# Patient Record
Sex: MALE | Race: WHITE | NOT HISPANIC OR LATINO | URBAN - METROPOLITAN AREA
[De-identification: names, ages, dates, MRNs, and addresses within clinical notes are randomized per-mention and may not be internally consistent; named-entity substitution may affect disease eponyms.]

---

## 2020-07-25 ENCOUNTER — TELEPHONE ENCOUNTER (OUTPATIENT)
Dept: URBAN - METROPOLITAN AREA CLINIC 13 | Facility: CLINIC | Age: 75
End: 2020-07-25

## 2020-07-25 RX ORDER — METRONIDAZOLE 250 MG/1
TAKE 1 TABLET BY MOUTH 4 TIMES A DAY FOR 14 DAYS TABLET ORAL
Qty: 56 | Refills: 1 | OUTPATIENT
Start: 2013-01-07 | End: 2013-09-20

## 2020-07-25 RX ORDER — ROSUVASTATIN CALCIUM 5 MG
TAKE 1 TABLET DAILY TABLET ORAL
Refills: 0 | OUTPATIENT
Start: 2011-06-01 | End: 2011-06-29

## 2020-07-25 RX ORDER — POLYETHYLENE GLYCOL 3350, SODIUM CHLORIDE, SODIUM BICARBONATE AND POTASSIUM CHLORIDE WITH LEMON FLAVOR 420; 11.2; 5.72; 1.48 G/4L; G/4L; G/4L; G/4L
TAKE 1/2 GALLON AT 5:00 PM DAY BEFORE PROCEDURE, TAKE SECOND 1/2 OF GALLON 6 HRS PRIOR TO PROCEDURE POWDER, FOR SOLUTION ORAL
Qty: 1 | Refills: 0 | OUTPATIENT
Start: 2018-11-08 | End: 2018-12-10

## 2020-07-25 RX ORDER — POLYETHYLENE GLYCOL 3350, SODIUM SULFATE, SODIUM CHLORIDE, POTASSIUM CHLORIDE, ASCORBIC ACID, SODIUM ASCORBATE 7.5-2.691G
TAKE 32 OZ AS DIRECTED 5:00PM THE EVENING BEFORE AND 6HR PRIOR TO PROCEDURE KIT ORAL
Qty: 1 | Refills: 0 | OUTPATIENT
Start: 2013-10-18

## 2020-07-25 RX ORDER — SODIUM SULFATE, POTASSIUM SULFATE, MAGNESIUM SULFATE 17.5; 3.13; 1.6 G/ML; G/ML; G/ML
TAKE 1 BOTTLE AT 5:00PM THE DAY BEFORE PROCEDURE. TAKE 2ND BOTTLE 6 HRS PRIOR TO PROCEDURE SOLUTION, CONCENTRATE ORAL
Qty: 1 | Refills: 0 | OUTPATIENT
Start: 2018-12-10 | End: 2018-12-12

## 2020-07-25 RX ORDER — POLYETHYLENE GLYCOL 3350, SODIUM CHLORIDE, SODIUM BICARBONATE AND POTASSIUM CHLORIDE WITH LEMON FLAVOR 420; 11.2; 5.72; 1.48 G/4L; G/4L; G/4L; G/4L
USE AS DIRECTED POWDER, FOR SOLUTION ORAL
Qty: 1 | Refills: 0 | OUTPATIENT
Start: 2013-10-18 | End: 2013-10-29

## 2020-07-26 ENCOUNTER — TELEPHONE ENCOUNTER (OUTPATIENT)
Dept: URBAN - METROPOLITAN AREA CLINIC 13 | Facility: CLINIC | Age: 75
End: 2020-07-26

## 2020-07-26 RX ORDER — FLECAINIDE ACETATE 50 MG/1
TABLET ORAL
Qty: 180 | Refills: 0 | Status: ACTIVE | COMMUNITY
Start: 2017-07-03

## 2020-07-26 RX ORDER — METOPROLOL SUCCINATE 25 MG/1
TABLET, EXTENDED RELEASE ORAL
Qty: 90 | Refills: 0 | Status: ACTIVE | COMMUNITY
Start: 2017-04-05

## 2020-07-26 RX ORDER — MELOXICAM 15 MG/1
TABLET ORAL
Qty: 30 | Refills: 0 | Status: ACTIVE | COMMUNITY
Start: 2018-02-19

## 2020-07-26 RX ORDER — MUPIROCIN 20 MG/G
OINTMENT TOPICAL
Qty: 22 | Refills: 0 | Status: ACTIVE | COMMUNITY
Start: 2017-12-22

## 2020-07-26 RX ORDER — ROSUVASTATIN CALCIUM 20 MG/1
TABLET, FILM COATED ORAL
Qty: 90 | Refills: 0 | Status: ACTIVE | COMMUNITY
Start: 2017-10-10

## 2020-07-26 RX ORDER — ROSUVASTATIN CALCIUM 5 MG
TAKE 1 TABLET DAILY PT STATES UNKNOWN DOSE TABLET ORAL
Refills: 0 | Status: ACTIVE | COMMUNITY
Start: 2011-06-29

## 2020-07-26 RX ORDER — MEMANTINE HYDROCHLORIDE 10 MG/1
TABLET ORAL
Qty: 90 | Refills: 0 | Status: ACTIVE | COMMUNITY
Start: 2018-03-15

## 2020-07-26 RX ORDER — APIXABAN 5 MG/1
TABLET, FILM COATED ORAL
Qty: 180 | Refills: 0 | Status: ACTIVE | COMMUNITY
Start: 2017-10-11

## 2020-08-18 ENCOUNTER — OFFICE VISIT (OUTPATIENT)
Dept: URBAN - METROPOLITAN AREA CLINIC 113 | Facility: CLINIC | Age: 75
End: 2020-08-18

## 2020-08-18 NOTE — HPI-TODAY'S VISIT:
This is a 75-year-old gentleman with a history of hyperlipidemia, SVT, gastritis, diverticulitis status post partial colectomy, and adenomatous colon polyps due surveillance in 2023 presenting for evaluation of constipation.  He was last seen 12/26/2018 following a colonoscopy notable for removal of a hyperplastic polyp, diverticulosis, internal hemorrhoids, and a healthy-appearing anastomosis.  He reported occasional constipation associated with dehydration from working outside.  He was instructed to begin a fiber supplement and repeat colonoscopy in 5 years.

## 2020-09-09 ENCOUNTER — OFFICE VISIT (OUTPATIENT)
Dept: URBAN - METROPOLITAN AREA CLINIC 113 | Facility: CLINIC | Age: 75
End: 2020-09-09
Payer: MEDICARE

## 2020-09-09 VITALS
RESPIRATION RATE: 18 BRPM | DIASTOLIC BLOOD PRESSURE: 64 MMHG | SYSTOLIC BLOOD PRESSURE: 143 MMHG | BODY MASS INDEX: 26.48 KG/M2 | HEART RATE: 57 BPM | WEIGHT: 185 LBS | HEIGHT: 70 IN | TEMPERATURE: 98.2 F

## 2020-09-09 DIAGNOSIS — Z86.010 HISTORY OF ADENOMATOUS POLYP OF COLON: ICD-10-CM

## 2020-09-09 DIAGNOSIS — K59.09 CHRONIC CONSTIPATION: ICD-10-CM

## 2020-09-09 PROCEDURE — G8427 DOCREV CUR MEDS BY ELIG CLIN: HCPCS | Performed by: NURSE PRACTITIONER

## 2020-09-09 PROCEDURE — 99213 OFFICE O/P EST LOW 20 MIN: CPT | Performed by: NURSE PRACTITIONER

## 2020-09-09 RX ORDER — FLECAINIDE ACETATE 50 MG/1
TABLET ORAL
Qty: 180 | Refills: 0 | Status: ACTIVE | COMMUNITY
Start: 2017-07-03

## 2020-09-09 RX ORDER — METOPROLOL SUCCINATE 25 MG/1
1 TABLET TABLET, EXTENDED RELEASE ORAL ONCE A DAY
Refills: 0 | Status: ACTIVE | COMMUNITY
Start: 2017-04-05

## 2020-09-09 RX ORDER — APIXABAN 5 MG/1
1 TABLET TABLET, FILM COATED ORAL
Refills: 0 | Status: ACTIVE | COMMUNITY
Start: 2017-10-11

## 2020-09-09 RX ORDER — ROSUVASTATIN CALCIUM 20 MG/1
1 TABLET TABLET, FILM COATED ORAL ONCE A DAY
Refills: 0 | Status: ACTIVE | COMMUNITY
Start: 2017-10-10

## 2020-09-09 NOTE — HPI-TODAY'S VISIT:
This is a 51-year-old gentleman with a  family history of colon adenomas due surveillance in 2023 and a personal history of complicated hepatic flexure diverticulitis (2015).  He was last seen 10/31/2018 following a colonoscopy performed to evaluate iron deficiency anemia notable for removal of hyperplastic polyps and diverticulosis.  Labs were requested from his primary care physician for review.  He was scheduled for an EGD and instructed to take Benefiber daily.   EGD 1/14/2019: Irregular Z line with hyperemia consistent with GERD, no endoscopic abnormality to explain dysphagia status post empiric dilation with 18 mm savory, nonerosive gastropathy status post biopsy, erythematous to adenopathy, patchy, status post biopsy.  Duodenal and stomach body and antral biopsies demonstrated no significant abnormality. He reports a history of chronic constipation which is  worsening over time.  He is having 2 bowel movements a week.  He states he may have days during which he does not have a bowel movement.  Once or twice a month, his stools are hard and he requires Dulcolax.  Occasionally, he will notice a small volume of blood on the tissue after passing a hard stool.  He denies abdominal pain.  He has occasional heartburn occurring less than once a week for which he takes antacids.  He denies any other abdominal symptoms.  He has routine labs with Dr. Galvan and denies any abnormalities.  He has not seen him in months.

## 2020-09-12 ENCOUNTER — DASHBOARD ENCOUNTERS (OUTPATIENT)
Age: 75
End: 2020-09-12

## 2020-09-12 PROBLEM — 236069009: Status: ACTIVE | Noted: 2020-09-09

## 2020-09-12 PROBLEM — 711150003: Status: ACTIVE | Noted: 2020-09-12

## 2020-09-12 PROBLEM — 429047008: Status: ACTIVE | Noted: 2020-09-12

## 2021-06-25 ENCOUNTER — OFFICE VISIT (OUTPATIENT)
Dept: URBAN - METROPOLITAN AREA CLINIC 113 | Facility: CLINIC | Age: 76
End: 2021-06-25

## 2021-09-21 ENCOUNTER — OFFICE VISIT (OUTPATIENT)
Dept: URBAN - METROPOLITAN AREA CLINIC 113 | Facility: CLINIC | Age: 76
End: 2021-09-21